# Patient Record
Sex: MALE
[De-identification: names, ages, dates, MRNs, and addresses within clinical notes are randomized per-mention and may not be internally consistent; named-entity substitution may affect disease eponyms.]

---

## 2020-02-05 ENCOUNTER — HOSPITAL ENCOUNTER (EMERGENCY)
Dept: HOSPITAL 56 - MW.ED | Age: 40
Discharge: HOME | End: 2020-02-05
Payer: COMMERCIAL

## 2020-02-05 DIAGNOSIS — S16.1XXA: ICD-10-CM
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
## 2020-02-05 DIAGNOSIS — S06.0X0A: Primary | ICD-10-CM

## 2020-02-05 DIAGNOSIS — S29.012A: ICD-10-CM

## 2020-02-05 NOTE — EDM.PDOC
ED HPI GENERAL MEDICAL PROBLEM





- General


Chief Complaint: Head Injury


Stated Complaint: HEAD PAIN


Time Seen by Provider: 02/05/20 10:04





- History of Present Illness


INITIAL COMMENTS - FREE TEXT/NARRATIVE: 


HISTORY AND PHYSICAL:





History of present illness:


Patient is a 39-year-old male who presents 24 hours status post head neck and 

upper back injury in which a tool bag fell from approximately 30 feet on top of 

his head he did have a hard hat on did not have loss of consciousness but 

presents today with mild dizziness mild headache and lower neck and upper back 

pain there is been no numbness weakness or other complaints.





Review of systems: 


As per history of present illness and below otherwise all systems reviewed and 

negative.





Past medical history: 


As per history of present illness and as reviewed below otherwise 

noncontributory.





Surgical history: 


As per history of present illness and as reviewed below otherwise 

noncontributory.





Social history: 


No reported history of drug or alcohol abuse.





Family history: 


As per history of present illness and as reviewed below otherwise 

noncontributory.





Physical exam:


HEENT: Atraumatic, normocephalic, pupils reactive, negative for conjunctival 

pallor or scleral icterus, mucous membranes moist, throat clear, neck supple, 

nontender, trachea midline.


Lungs: Clear to auscultation, breath sounds equal bilaterally, chest nontender.


Heart: S1S2, regular, negative for clicks, rubs, or JVD.


Abdomen: Soft, nondistended, nontender. Negative for masses or 

hepatosplenomegaly. Negative for costovertebral tenderness.


Pelvis: Stable nontender.


Genitourinary: Deferred.


Rectal: Deferred.


Extremities: Atraumatic, negative for cords or calf pain. Neurovascular 

unremarkable.


Neuro: Awake, alert, oriented. Cranial nerves II through XII unremarkable. 

Cerebellum unremarkable. Motor and sensory unremarkable throughout. Exam 

nonfocal.





Diagnostics:


CT brain C-spine and thoracic spine





Therapeutics:


None





Impression: 


#1 concussion #2 cervical/thoracic spine injury





Definitive disposition and diagnosis as appropriate pending reevaluation and 

review of above.








- Related Data


 Allergies











Allergy/AdvReac Type Severity Reaction Status Date / Time


 


No Known Allergies Allergy   Verified 02/05/20 10:22











Home Meds: 


 Home Meds





. [No Known Home Meds]  02/05/20 [History]











ED ROS GENERAL





- Review of Systems


Review Of Systems: Comprehensive ROS is negative, except as noted in HPI.





ED EXAM, HEAD INJURY





- Physical Exam


Exam: See Below (Dictation)





Course





- Vital Signs


Last Recorded V/S: 


 Last Vital Signs











Temp  36.7 C   02/05/20 10:23


 


Pulse  100   02/05/20 10:23


 


Resp  18   02/05/20 10:23


 


BP  141/87 H  02/05/20 10:23


 


Pulse Ox  98   02/05/20 10:23














Departure





- Departure


Time of Disposition: 12:00


Disposition: Home, Self-Care 01


Condition: Good


Clinical Impression: 


 Concussion injury of brain, Encounter for medical screening examination, 

Thoracic myofascial strain, Cervical strain








- Discharge Information


Referrals: 


Ray Lagunas MD [Primary Care Provider] - 


Forms:  ED Department Discharge


Additional Instructions: 


The following information is given to patients seen in the emergency department 

who are being discharged to home. This information is to outline your options 

for follow-up care. We provide all patients seen in our emergency department 

with a follow-up referral.





The need for follow-up, as well as the timing and circumstances, are variable 

depending upon the specifics of your emergency department visit.





If you don't have a primary care physician on staff, we will provide you with a 

referral. We always advise you to contact your personal physician following an 

emergency department visit to inform them of the circumstance of the visit and 

for follow-up with them and/or the need for any referrals to a consulting 

specialist.





The emergency department will also refer you to a specialist when appropriate. 

This referral assures that you have the opportunity for followup care with a 

specialist. All of these measure are taken in an effort to provide you with 

optimal care, which includes your followup.





Under all circumstances we always encourage you to contact your private 

physician who remains a resource for coordinating  your care. When calling for 

followup care, please make the office aware that this follow-up is from your 

recent emergency room visit. If for any reason you are refused follow-up, 

please contact the [facility] emergency department at [phone] 

and asked to speak to the emergency department charge nurse.

















Follow-up occupational medicine Motrin/Tylenol as directed return as needed as 

discussed





Sepsis Event Note





- Focused Exam


Vital Signs: 


 Vital Signs











  Temp Pulse Resp BP Pulse Ox


 


 02/05/20 10:23  36.7 C  100  18  141/87 H  98

## 2020-02-05 NOTE — CT
CT cervical spine

 

Technique: Multiple axial sections through the cervical spine was 

obtained.  Study was obtained from above C1 inferiorly to the mid T2 

level.  Reconstructed sagittal and coronal images were reviewed.

 

Findings: Vertebral body heights and disc spaces are maintained.  

Vertebral bodies and posterior arches are intact.  No fracture is 

appreciated.  No abnormal subluxation is seen.  Minimal retention cyst

 is noted within the right maxillary sinus.  Mastoid sinuses are 

clear.

 

Impression:

1.  Nothing acute is appreciated on CT study of the cervical spine.

2.  Minimal sinus finding as noted above.

 

Diagnostic code #2

 

This report was dictated in [place] Standard Time Patient stated she is receiving PT/OT now.    Valeria ZAMBRANO RN, BSN, PHN

## 2020-02-05 NOTE — CT
Head CT

 

Technique: Multiple axial sections through the brain were obtained.  

Intravenous contrast was not utilized.

 

Comparison: No prior intracranial imaging is available.

 

Findings: Ventricles along with basal cisterns and sulci over the 

convexities appear within normal limits.  No abnormal parenchymal 

densities are seen.  No evidence of intracranial hemorrhage.  No 

midline shift or mass effect is seen.

 

Bone window settings were reviewed.  No acute calvarial abnormality is

 appreciated.  Small retention cyst is noted within the right 

maxillary sinus.  Minimal mucosal thickening is seen within the 

posterior right ethmoid sinus.  No acute paranasal sinus disease is 

seen.  Mastoid sinuses show nothing acute.

 

Impression:

1.  Nothing acute is appreciated on noncontrast head CT study.

2.  Minimal sinus findings which are believed to be incidental and 

pre-existing.

 

Diagnostic code #2

 

This report was dictated in [place] [employer] Time

## 2020-02-05 NOTE — CT
CT thoracic spine

 

Technique: Multiple axial sections through the thoracic spine were 

obtained.  Reconstructed coronal and sagittal images were reviewed.

 

Comparison: No prior thoracic spine imaging is available.

 

Findings: Vertebral body heights and disc spaces are maintained.  No 

abnormal subluxation is identified.  No fracture is appreciated.  No 

bony central or bony neural foraminal stenosis is seen.  Mild 

degenerative change is noted at the sternomanubrial junction.  Vacuum 

phenomena is noted within the sternoclavicular joints.

 

Impression:

1.  Findings as noted above compatible with degenerative change.

2.  Nothing acute is appreciated on CT study of the thoracic spine.

 

Diagnostic code #2

 

This report was dictated in [place] Standard Time

## 2023-05-20 ENCOUNTER — HOSPITAL ENCOUNTER (EMERGENCY)
Dept: HOSPITAL 56 - MW.ED | Age: 43
Discharge: HOME | End: 2023-05-20
Payer: COMMERCIAL
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
## 2023-05-20 DIAGNOSIS — S67.22XA: Primary | ICD-10-CM
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
## 2023-05-20 PROCEDURE — 73130 X-RAY EXAM OF HAND: CPT

## 2023-05-20 PROCEDURE — 99283 EMERGENCY DEPT VISIT LOW MDM: CPT

## 2023-08-06 ENCOUNTER — HOSPITAL ENCOUNTER (EMERGENCY)
Dept: HOSPITAL 56 - MW.ED | Age: 43
Discharge: HOME | End: 2023-08-06
Payer: COMMERCIAL

## 2023-08-06 DIAGNOSIS — Z79.899: ICD-10-CM

## 2023-08-06 DIAGNOSIS — M17.11: Primary | ICD-10-CM

## 2023-08-06 LAB
BASOPHILS # BLD AUTO: 0 K/UL (ref 0–0.1)
BASOPHILS NFR BLD AUTO: 0.6 % (ref 0–1.5)
EOSINOPHIL # BLD AUTO: 0.1 K/UL (ref 0–0.7)
EOSINOPHIL NFR BLD AUTO: 1.1 % (ref 0–7)
HCT VFR BLD AUTO: 43.1 % (ref 38–50)
HGB BLD-MCNC: 14.7 G/DL (ref 13–17)
LYMPHOCYTES # BLD AUTO: 1.2 K/UL (ref 0.6–2.4)
LYMPHOCYTES NFR BLD AUTO: 17.4 % (ref 16–40)
MCH RBC QN AUTO: 28 PG (ref 27–32)
MCHC RBC AUTO-ENTMCNC: 34.1 G/DL (ref 31–37)
MCHC RBC AUTO-ENTMCNC: 82.1 FL (ref 80–98)
MONOCYTES # BLD AUTO: 0.3 K/UL (ref 0–0.8)
MONOCYTES NFR BLD AUTO: 4.3 % (ref 0–15)
NEUTROPHILS # BLD AUTO: 5.3 K/UL (ref 1.4–5.7)
NEUTROPHILS NFR BLD AUTO: 76.6 % (ref 48–80)
NRBC BLD AUTO-RTO: 0 /100WBC
NRBC BLD AUTO-RTO: 0 K/UL
PLATELET # BLD AUTO: 243 K/UL (ref 150–400)
PMV BLD AUTO: 11.1 FL (ref 7.4–12)
RBC # BLD AUTO: 5.25 M/UL (ref 4.5–5.9)
WBC # BLD AUTO: 6.96 K/UL (ref 4–11)

## 2023-08-06 PROCEDURE — 96372 THER/PROPH/DIAG INJ SC/IM: CPT

## 2023-08-06 PROCEDURE — 85025 COMPLETE CBC W/AUTO DIFF WBC: CPT

## 2023-08-06 PROCEDURE — 36415 COLL VENOUS BLD VENIPUNCTURE: CPT

## 2023-08-06 PROCEDURE — 84550 ASSAY OF BLOOD/URIC ACID: CPT

## 2023-08-06 PROCEDURE — 99283 EMERGENCY DEPT VISIT LOW MDM: CPT
